# Patient Record
Sex: MALE | Race: BLACK OR AFRICAN AMERICAN
[De-identification: names, ages, dates, MRNs, and addresses within clinical notes are randomized per-mention and may not be internally consistent; named-entity substitution may affect disease eponyms.]

---

## 2017-01-23 NOTE — EMERGENCY ROOM VISIT NOTE
History


Report prepared by Darrell:  Tommie Brush


Under the Supervision of:  Dr. Pool Navarro M.D.


First contact with patient:  16:02


Chief Complaint:  SKIN PROBLEM


Stated Complaint:  SKIN IRRITATION ALL OVER BODY, POSS. FUNGAL ISSUE





History of Present Illness


The patient is a 21 year old male who presents to the Emergency Room with 

complaints of a worsening global skin rash beginning several months prior to 

arrival. He states he has been living in a basement and has noticed a rash all 

over his body that has worsened over the past couple of weeks. The patient 

describes the rash as itchy, and it is dry skin with moisture underneath when 

he scratches. He states he intermittently develops small yellow bumps on his 

skin and skin breakdown, as well. The patient notes he has seen several 

dermatologists in [place], in which, he was prescribed steroids but the 

rash returned. He states he has seen a podiatrist, who took a nail sample and 

determined it was fungal. The patient denies being on an antifungal medication. 

He states he has been using Vaseline on his skin without relief. The patient 

denies coming in contact with anyone experiencing similar symptoms. He states 

he has been urinating normally. The patient associates an intermittent cough 

with today's symptoms. He denies a fever, abdominal pain, shortness of breath, 

and a family history of similar symptoms.





   Source of History:  patient


   Onset:  several months PTA


   Position:  other (global skin)


   Quality:  other (itchy rash)


   Timing:  worsening


   Associated Symptoms:  + cough (intermittent), + rash, No SOB, No abdominal 

pain, No fevers


Note:


Associated symptoms: intermittent yellow bumps, skin breakdown.





Review of Systems


See HPI for pertinent positives & negatives. A total of 10 systems reviewed and 

were otherwise negative.





Past Medical & Surgical


Medical Problems:


(1) No pertinent past medical history








Family History





Patient reports no known family medical history.





Social History


Smoking Status:  Never Smoker


Marital Status:  single


Housing Status:  lives with friends





Current/Historical Medications


Scheduled


Fluconazole (Diflucan), 150 MG PO WK





Allergies


Coded Allergies:  


     Apple (Unverified  Allergy, Unknown, UNKNOWN, 1/23/17)


     Carrot (Unverified  Allergy, Unknown, UNKNOWN, 1/23/17)


     Soy Milk (Unverified  Allergy, Unknown, THROAT CLOSES, 1/23/17)





Physical Exam


Vital Signs











  Date Time  Temp Pulse Resp B/P Pulse Ox O2 Delivery O2 Flow Rate FiO2


 


1/23/17 16:54  76 18 122/69 99 Room Air  


 


1/23/17 15:54 36.4 88 18 103/56 100 Room Air  











Physical Exam


GENERAL: Patient is well appearing and in minimal distress.


HEENT: No acute trauma, normocephalic atraumatic, mucous membranes moist, no 

nasal congestion, no scleral icterus.


NECK: No stridor, no adenopathy, no meningismus, trachea is midline.


LUNGS: No dyspnea. Clear to auscultation and equal bilaterally. No wheeze, no 

rhonchi.


HEART: Regular rate and rhythm.  No murmurs, rubs, gallops appreciated.


ABDOMEN: Soft, nontender, bowel sounds positive, no masses appreciated, no 

peritonitis.


BACK: No midline tenderness, no CVA tenderness


EXTREMITIES: Normal motion all extremities, no cyanosis, no edema.


NEUROLOGIC: Alert and oriented, no acute motor or sensory deficits, no focal 

weakness, cranial nerves grossly intact.


SKIN: Dry, cracked skin over extremities and back. Excoriated over posterior 

knees. Dry, cracked skin extends over much of back. Does not include webbing of 

fingers, palms, behind ears, face, chest, or abdomen. There is no rash of 

axilla. No jaundice, no diaphoresis.





Medical Decision & Procedures


Medications Administered











 Medications


  (Trade)  Dose


 Ordered  Sig/Sheila


 Route  Start Time


 Stop Time Status Last Admin


Dose Admin


 


 Fluconazole


  (Diflucan Tab)  200 mg  NOW  ONCE


 PO  1/23/17 16:45


 1/23/17 16:46 DC 1/23/17 16:57


200 MG











ED Course


1605: The patient was evaluated in room A7. A complete history and physical 

exam was performed.





1620: Case Management was asked to set up a Dermatology appointment for the 

patient.





1645: Ordered Diflucan Tab 200 mg PO.





1650: Reevaluated the patient. Discussed results and discharge instructions: He 

verbalized understanding and agreement. The patient is in agreement with the 

Diflucan, and he will have his liver function tested in a week. The patient is 

ready for discharge.





Medical Decision


21 yr old male arrives for evaluation of skin disorder.  He is well appearing 

and in no distress.  Skin disorder ongoing for several months and admits he's 

seen multiple dermatologists and a podiatrist.  Notes he was told that this is 

fungal in nature though he is note very specific on that.  Already tried 

steroids and abx apparently without success.  No history of liver disease nor 

other issues.  No other people with similar other than possibly sister with 

mild case previously.  He wants to try anti-fungal.  Given extensive coverage 

of skin issue seems reasonable starting oral med as too extensive for cream.  

Case Management will work on getting him set up with Dermatology.  He is going 

to need LFT check in a week which I advised he do with PCP.  Currently no 

evidence of bacterial cellulitis.  Stable and looks well at discharge.





Impression





 Primary Impression:  


 Skin disorder





Scribe Attestation


The scribe's documentation has been prepared under my direction and personally 

reviewed by me in its entirety. I confirm that the note above accurately 

reflects all work, treatment, procedures, and medical decision making performed 

by me.





Departure Information


Dispostion


Home / Self-Care





Prescriptions





Fluconazole (DIFLUCAN) 150 Mg Tab


150 MG PO WK for 5 Days, #5 TAB


   Prov: Pool Navarro M.D.         1/23/17





Referrals


[facility]





Forms


HOME CARE DOCUMENTATION FORM,                                                 

               IMPORTANT VISIT INFORMATION, WORK / SCHOOL INSTRUCTIONS





Patient Instructions


My [facility]





Additional Instructions





It is unclear the exact cause of you skin disorder.


As several other physicians have already evaluated you and multiple other 

medications attempted we will attempt to treat with the anti-fungal medications 

that you requested.





To use these medications you will need to have blood work done about a week 

after you start the medication.


Please go to [facility] and discuss having Liver Function Tests 

done in about one week as you will be on Diflucan for several weeks.





Return if worsening symptoms, fevers, vomiting, redness to rash, drainage or 

other concerns.





We are here to help.  Case Management will try and help get you a Dermatology 

appointment in the area, though this can be very difficult to get done.

## 2017-12-01 ENCOUNTER — HOSPITAL ENCOUNTER (OUTPATIENT)
Dept: HOSPITAL 45 - C.CTS | Age: 22
Discharge: HOME | End: 2017-12-01
Payer: COMMERCIAL

## 2017-12-01 DIAGNOSIS — M21.6X1: ICD-10-CM

## 2017-12-01 DIAGNOSIS — M21.6X2: Primary | ICD-10-CM

## 2017-12-01 DIAGNOSIS — M77.8: ICD-10-CM

## 2017-12-01 NOTE — DIAGNOSTIC IMAGING REPORT
LOWER EXTREMITY WITHOUT, L LOWER EXTREMITY WITHOUT



CLINICAL HISTORY: 22 years-old Male presenting with RIGHT FOOT POSSIBLE

COALITION DO WITHOUT BILATERAL. 



TECHNIQUE: Multidetector CT of the bilateral feet was performed without the use

of intravenous contrast. IV contrast: None. A dose lowering technique was used

consistent with the principles of ALARA (as low as reasonably achievable).



COMPARISON: None.



CT DOSE (mGy.cm): The estimated cumulative dose is 326.89 mGy.cm.



FINDINGS:



 topogram: Unremarkable.



Right foot:

Ankle mortise intact. No degenerative change. No acute fracture or malalignment.

No evidence of a tarsal collision. Mild subcutaneous edema in the plantar foot. 



Left foot:

Ankle mortise intact. No degenerative change. No acute fracture or malalignment.

No evidence of a tarsal collision. Mild subcutaneous edema in the plantar foot. 



IMPRESSION:

1.  No evidence of a tarsal correlation in the bilateral feet. No osseous

abnormality.  







Electronically signed by:  Claudio Rich M.D.

12/1/2017 1:36 PM



Dictated Date/Time:  12/1/2017 1:26 PM

## 2018-07-29 ENCOUNTER — HOSPITAL ENCOUNTER (EMERGENCY)
Dept: HOSPITAL 45 - C.EDB | Age: 23
LOS: 1 days | Discharge: HOME | End: 2018-07-30
Payer: COMMERCIAL

## 2018-07-29 VITALS
BODY MASS INDEX: 20.52 KG/M2 | WEIGHT: 143.3 LBS | BODY MASS INDEX: 20.52 KG/M2 | WEIGHT: 143.3 LBS | HEIGHT: 70 IN | HEIGHT: 70 IN

## 2018-07-29 VITALS — TEMPERATURE: 97.88 F

## 2018-07-29 DIAGNOSIS — R25.2: Primary | ICD-10-CM

## 2018-07-29 DIAGNOSIS — Q82.8: ICD-10-CM

## 2018-07-29 DIAGNOSIS — Z91.018: ICD-10-CM

## 2018-07-29 DIAGNOSIS — F17.200: ICD-10-CM

## 2018-07-29 NOTE — EMERGENCY ROOM VISIT NOTE
History


Report prepared by Darrell:  Dasia Fontanez


Under the Supervision of:  Dr. Marisol Vo D.O.


First contact with patient:  23:35


Chief Complaint:  LEG PAIN,LEG INJURY


Stated Complaint:  LEGS CRAMPING





History of Present Illness


The patient is a 22 year old male who presents to the Emergency Room with 

complaints of worsening bilateral leg muscle cramps beginning around 1 hour 

pta. He states that yesterday he was in [place] for an ultrasound of his 

pancreas as he believed he has pancreatitis. The patient reports that whenever 

he eats or drinks, he gets a pain in his abdomen which he describes as "organ 

cramps." He is unsure of whether the ultrasound was positive or negative as he 

has not called the doctor yet. After researching pancreatitis, the patient 

believed he was dehydrated so he went to [facility] at 1800 tonight to get an 

IV. He states he did not receive one which brought him to the ED. 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 He notes 

that this morning, he had an allergic reaction to the skin of his chest and 

legs which caused him to develop "yellow patches." He states this caused his 

jeans to get wet, so he did not go to work today. He denies any dysuria but 

states his urine has been "bubbling." The patient states he has drank some 

alcohol tonight.





   Source of History:  patient


   Onset:  around 1 hour pta


   Position:  leg (bilateral)


   Timing:  worsening


   Associated Symptoms:  + abdominal pain ("organ cramps"), + urinary symptoms (

urine is "bubbling," no dysuria)


Note:


Positive extreme itching





Review of Systems


See HPI for pertinent positives & negatives. A total of 10 systems reviewed and 

were otherwise negative.





Past Medical & Surgical


Medical Problems:


(1) No pertinent past medical history








Family History





Patient reports no known family medical history.





Social History


Smoking Status:  Current Every Day Smoker


Marital Status:  single


Housing Status:  lives with friends


Occupation Status:  employed





Current/Historical Medications


No Active Prescriptions or Reported Meds





Allergies


Coded Allergies:  


     Apple (Unverified  Allergy, Unknown, UNKNOWN, 7/30/18)


     Carrot (Unverified  Allergy, Unknown, UNKNOWN, 7/30/18)


     Soy Milk (Unverified  Allergy, Unknown, THROAT CLOSES, 7/30/18)





Physical Exam


Vital Signs











  Date Time  Temp Pulse Resp B/P (MAP) Pulse Ox O2 Delivery O2 Flow Rate FiO2


 


7/30/18 03:31  93 18 120/58 99   


 


7/30/18 02:24  106 18 110/49 99 Room Air  


 


7/29/18 23:29 36.6 98 18 107/74 98 Room Air  











Physical Exam


General: Appears extremely anxious. Having cramps in his legs. Unable to sit 

still. Smells of alcohol. 


HEENT: Head - normocephalic and atraumatic   Pupils are equal, round, and 

reactive to light.  Extraocular eye muscles are intact, and sclera are 

anicteric.   Nose -  moist nasal mucosa without discharge. Mouth - moist buccal 

mucosa.  Oropharynx is nonerythematous and there is no tonsillar exudate or 

edema noted.


Neck: Supple; no JVD, nuchal rigidity, cervical lymphadenopathy.


Heart: Regular rate and rhythm.  There is a normal S1 and S2 with no murmurs, 

clicks, or gallops appreciated.


Lungs: Clear to auscultation bilaterally with no wheezes, rales, or rhonchi.


Abdomen: Soft, completely nontender, nondistended, with good bowel sounds.  

There are no palpable pulsatile masses or hepatosplenomegaly.  There is no 

guarding, rigidity, or rebound noted.


Extremities: No evidence of cyanosis, clubbing, or edema.  There are easily 

palpable peripheral pulses.


Skin: warm and dry with good turgor. Atopic dermatitis.  No obvious drainage of 

skin lesions identified.





Medical Decision & Procedures


Laboratory Results


7/30/18 00:07








Red Blood Count 4.32, Mean Corpuscular Volume 89.1, Mean Corpuscular Hemoglobin 

31.3, Mean Corpuscular Hemoglobin Concent 35.1, Mean Platelet Volume 10.8, 

Neutrophils (%) (Auto) 57.1, Lymphocytes (%) (Auto) 23.9, Monocytes (%) (Auto) 

7.2, Eosinophils (%) (Auto) 11.5, Basophils (%) (Auto) 0.2, Neutrophils # (Auto

) 4.57, Lymphocytes # (Auto) 1.92, Monocytes # (Auto) 0.58, Eosinophils # (Auto

) 0.92, Basophils # (Auto) 0.02





7/30/18 00:07

















Test


  7/30/18


00:07 7/30/18


00:16


 


White Blood Count


  8.02 K/uL


(4.8-10.8) 


 


 


Red Blood Count


  4.32 M/uL


(4.7-6.1) 


 


 


Hemoglobin


  13.5 g/dL


(14.0-18.0) 


 


 


Hematocrit 38.5 % (42-52)  


 


Mean Corpuscular Volume


  89.1 fL


() 


 


 


Mean Corpuscular Hemoglobin


  31.3 pg


(25-34) 


 


 


Mean Corpuscular Hemoglobin


Concent 35.1 g/dl


(32-36) 


 


 


Platelet Count


  268 K/uL


(130-400) 


 


 


Mean Platelet Volume


  10.8 fL


(7.4-10.4) 


 


 


Neutrophils (%) (Auto) 57.1 %  


 


Lymphocytes (%) (Auto) 23.9 %  


 


Monocytes (%) (Auto) 7.2 %  


 


Eosinophils (%) (Auto) 11.5 %  


 


Basophils (%) (Auto) 0.2 %  


 


Neutrophils # (Auto)


  4.57 K/uL


(1.4-6.5) 


 


 


Lymphocytes # (Auto)


  1.92 K/uL


(1.2-3.4) 


 


 


Monocytes # (Auto)


  0.58 K/uL


(0.11-0.59) 


 


 


Eosinophils # (Auto)


  0.92 K/uL


(0-0.5) 


 


 


Basophils # (Auto)


  0.02 K/uL


(0-0.2) 


 


 


RDW Standard Deviation


  39.7 fL


(36.4-46.3) 


 


 


RDW Coefficient of Variation


  12.3 %


(11.5-14.5) 


 


 


Immature Granulocyte % (Auto) 0.1 %  


 


Immature Granulocyte # (Auto)


  0.01 K/uL


(0.00-0.02) 


 


 


Anion Gap


  13.0 mmol/L


(3-11) 


 


 


Est Creatinine Clear Calc


Drug Dose 71.2 ml/min 


  


 


 


Estimated GFR (


American) 76.3 


  


 


 


Estimated GFR (Non-


American 65.8 


  


 


 


BUN/Creatinine Ratio 9.3 (10-20)  


 


Calcium Level


  8.9 mg/dl


(8.5-10.1) 


 


 


Total Bilirubin


  0.7 mg/dl


(0.2-1) 


 


 


Direct Bilirubin


  0.2 mg/dl


(0-0.2) 


 


 


Aspartate Amino Transf


(AST/SGOT) 34 U/L (15-37) 


  


 


 


Alanine Aminotransferase


(ALT/SGPT) 30 U/L (12-78) 


  


 


 


Alkaline Phosphatase


  74 U/L


() 


 


 


Total Protein


  8.0 gm/dl


(6.4-8.2) 


 


 


Albumin


  4.2 gm/dl


(3.4-5.0) 


 


 


Lipase


  129 U/L


() 


 


 


Ethyl Alcohol mg/dL


  23.0 mg/dl


(0-3) 


 


 


Urine Color  YELLOW 


 


Urine Appearance  CLEAR (CLEAR) 


 


Urine pH  5.0 (4.5-7.5) 


 


Urine Specific Gravity


  


  1.018


(1.000-1.030)


 


Urine Protein  TRACE (NEG) 


 


Urine Glucose (UA)  NEG (NEG) 


 


Urine Ketones  TRACE (NEG) 


 


Urine Occult Blood  NEG (NEG) 


 


Urine Nitrite  NEG (NEG) 


 


Urine Bilirubin  NEG (NEG) 


 


Urine Urobilinogen  NEG (NEG) 


 


Urine Leukocyte Esterase  NEG (NEG) 


 


Urine WBC (Auto)  1-5 /hpf (0-5) 


 


Urine RBC (Auto)  0-4 /hpf (0-4) 


 


Urine Hyaline Casts (Auto)


  


  5-10 /lpf


(0-5)


 


Urine Epithelial Cells (Auto)


  


  5-10 /lpf


(0-5)


 


Urine Bacteria (Auto)  NEG (NEG) 


 


Urine Opiates Screen  NEG (NEG) 


 


Urine Methadone, Qualitative  NEG (NEG) 


 


Urine Barbiturates  NEG (NEG) 


 


Urine Phencyclidine (PCP)


Level 


  NEG (NEG) 


 


 


Ur


Amphetamine/Methamphetamine 


  NEG (NEG) 


 


 


MDMA (Ecstasy) Screen  NEG (NEG) 


 


Urine Benzodiazepines Screen  NEG (NEG) 


 


Urine Cocaine Metabolite  NEG (NEG) 


 


Urine Marijuana (THC)  POS (NEG) 





Laboratory results per my review.





Medications Administered











 Medications


  (Trade)  Dose


 Ordered  Sig/Sheila


 Route  Start Time


 Stop Time Status Last Admin


Dose Admin


 


 Sodium Chloride  1,000 ml @ 


 999 mls/hr  Q1H1M STAT


 IV  7/29/18 23:47


 7/30/18 00:47 DC 7/30/18 00:43


999 MLS/HR


 


 Lorazepam


  (Ativan Inj)  1 mg  NOW  STAT


 IV  7/29/18 23:47


 7/29/18 23:50 DC 7/30/18 00:43


1 MG











Procedure


Lorazepam IV, NSS IV





ED Course


2338: Past medical records reviewed. The patient was evaluated in room A4. A 

complete history and physical exam was performed.  An IV lock was initiated and 

labs were drawn as above.





2347: Ordered Lorazepam 1 mg IV, Sodium Chloride 1000 ml @ 999 mls/hr IV. 





0045: I checked on the patient at this time. He states it was irresistible to 

scratch his skin and he pulled the IV out.  The IV was restarted so that he 

could receive his IV fluids.  His leg cramps are improving. 





0118: Upon reevaluation, the patient was asleep. I woke him up and he is 

feeling much better. He will follow up with his [facility] PCP. I discussed 

findings and results with him. He verbalized agreement of the treatment plan. 

He was discharged home.





Medical Decision


The patient is a 22 year old male who presents to the Emergency Room with 

complaints of worsening bilateral leg muscle cramps beginning around 1 hour 

pta. Differential diagnosis includes alcohol intoxication, mood disorder, 

thought disorder, electrolyte abnormality, dehydration, anxiety, pancreatitis, 

as well as others were entertained. 





Lab results show


No leukocytosis 


Hemoglobin 13.5 


BUN 14


Creatinine 1.4 


Glucose 116


LFTs are normal 


Lipase is normal 


Urinalysis has trace proteins and trace ketones


Alcohol level was 23


Tox screen was positive for marijuana 





This is a 22-year-old male patient presents to the emergency department with 

severe cramps in his legs.  The patient believes that he may be dehydrated as a 

result of possible pancreatitis.  Pancreatic enzymes are unremarkable.  

Laboratory studies were normal.  The patient felt much better after receiving 

IV crystalloid therapy.  I have encouraged the patient to follow-up with his 

PCP for a repeat urine specimen as he did have some protein in his urine as 

well as repeat renal function studies.  He is followed by a dermatologist in 

[place].





Medication Reconcilliation


Current Medication List:  was personally reviewed by me





Blood Pressure Screening


Patient's blood pressure:  Normal blood pressure


Blood pressure disposition:  Did not require urgent referral





Impression





 Primary Impression:  


 Muscle cramps





Scribe Attestation


The scribe's documentation has been prepared under my direction and personally 

reviewed by me in its entirety. I confirm that the note above accurately 

reflects all work, treatment, procedures, and medical decision making performed 

by me.





Departure Information


Dispostion


Home / Self-Care





Prescriptions





No Active Prescriptions or Reported Meds





Referrals


No Doctor, Assigned (PCP)





Forms


HOME CARE DOCUMENTATION FORM,                                                 

               IMPORTANT VISIT INFORMATION





Patient Instructions


My [facility]





Additional Instructions





Rest.





take plenty of clear liquids but avoid alcohol.





Follow up with  your PCP at [facility] to recheck your kidney function tests and 

to recheck your urine.  You had protein in your urine

## 2018-07-30 VITALS — HEART RATE: 93 BPM | SYSTOLIC BLOOD PRESSURE: 120 MMHG | DIASTOLIC BLOOD PRESSURE: 58 MMHG | OXYGEN SATURATION: 99 %

## 2018-07-30 LAB
ALBUMIN SERPL-MCNC: 4.2 GM/DL (ref 3.4–5)
ALP SERPL-CCNC: 74 U/L (ref 45–117)
ALT SERPL-CCNC: 30 U/L (ref 12–78)
AST SERPL-CCNC: 34 U/L (ref 15–37)
BASOPHILS # BLD: 0.02 K/UL (ref 0–0.2)
BASOPHILS NFR BLD: 0.2 %
BUN SERPL-MCNC: 14 MG/DL (ref 7–18)
CALCIUM SERPL-MCNC: 8.9 MG/DL (ref 8.5–10.1)
CO2 SERPL-SCNC: 22 MMOL/L (ref 21–32)
CREAT SERPL-MCNC: 1.47 MG/DL (ref 0.6–1.4)
EOS ABS #: 0.92 K/UL (ref 0–0.5)
EOSINOPHIL NFR BLD AUTO: 268 K/UL (ref 130–400)
GLUCOSE SERPL-MCNC: 116 MG/DL (ref 70–99)
HCT VFR BLD CALC: 38.5 % (ref 42–52)
HGB BLD-MCNC: 13.5 G/DL (ref 14–18)
IG#: 0.01 K/UL (ref 0–0.02)
IMM GRANULOCYTES NFR BLD AUTO: 23.9 %
LIPASE: 129 U/L (ref 73–393)
LYMPHOCYTES # BLD: 1.92 K/UL (ref 1.2–3.4)
MCH RBC QN AUTO: 31.3 PG (ref 25–34)
MCHC RBC AUTO-ENTMCNC: 35.1 G/DL (ref 32–36)
MCV RBC AUTO: 89.1 FL (ref 80–100)
MONO ABS #: 0.58 K/UL (ref 0.11–0.59)
MONOCYTES NFR BLD: 7.2 %
NEUT ABS #: 4.57 K/UL (ref 1.4–6.5)
NEUTROPHILS # BLD AUTO: 11.5 %
NEUTROPHILS NFR BLD AUTO: 57.1 %
PMV BLD AUTO: 10.8 FL (ref 7.4–10.4)
POTASSIUM SERPL-SCNC: 3.4 MMOL/L (ref 3.5–5.1)
PROT SERPL-MCNC: 8 GM/DL (ref 6.4–8.2)
RED CELL DISTRIBUTION WIDTH CV: 12.3 % (ref 11.5–14.5)
RED CELL DISTRIBUTION WIDTH SD: 39.7 FL (ref 36.4–46.3)
SODIUM SERPL-SCNC: 138 MMOL/L (ref 136–145)
WBC # BLD AUTO: 8.02 K/UL (ref 4.8–10.8)